# Patient Record
Sex: FEMALE | Race: WHITE
[De-identification: names, ages, dates, MRNs, and addresses within clinical notes are randomized per-mention and may not be internally consistent; named-entity substitution may affect disease eponyms.]

---

## 2018-02-06 ENCOUNTER — HOSPITAL ENCOUNTER (EMERGENCY)
Dept: HOSPITAL 45 - C.EDA | Age: 58
Discharge: HOME | End: 2018-02-06
Payer: COMMERCIAL

## 2018-02-06 VITALS — TEMPERATURE: 98.6 F

## 2018-02-06 VITALS
HEIGHT: 59.02 IN | HEIGHT: 59.02 IN | BODY MASS INDEX: 58.22 KG/M2 | BODY MASS INDEX: 58.22 KG/M2 | WEIGHT: 288.81 LBS | WEIGHT: 288.81 LBS

## 2018-02-06 VITALS — DIASTOLIC BLOOD PRESSURE: 78 MMHG | HEART RATE: 92 BPM | SYSTOLIC BLOOD PRESSURE: 124 MMHG | OXYGEN SATURATION: 98 %

## 2018-02-06 DIAGNOSIS — Z79.82: ICD-10-CM

## 2018-02-06 DIAGNOSIS — R10.11: Primary | ICD-10-CM

## 2018-02-06 DIAGNOSIS — R05: ICD-10-CM

## 2018-02-06 DIAGNOSIS — K90.0: ICD-10-CM

## 2018-02-06 DIAGNOSIS — R73.03: ICD-10-CM

## 2018-02-06 LAB
ALBUMIN SERPL-MCNC: 3.4 GM/DL (ref 3.4–5)
ALP SERPL-CCNC: 115 U/L (ref 45–117)
ALT SERPL-CCNC: 17 U/L (ref 12–78)
AST SERPL-CCNC: 11 U/L (ref 15–37)
BASOPHILS # BLD: 0.02 K/UL (ref 0–0.2)
BASOPHILS NFR BLD: 0.2 %
BUN SERPL-MCNC: 16 MG/DL (ref 7–18)
CALCIUM SERPL-MCNC: 9.1 MG/DL (ref 8.5–10.1)
CO2 SERPL-SCNC: 30 MMOL/L (ref 21–32)
CREAT SERPL-MCNC: 0.74 MG/DL (ref 0.6–1.2)
EOS ABS #: 0.23 K/UL (ref 0–0.5)
EOSINOPHIL NFR BLD AUTO: 326 K/UL (ref 130–400)
GLUCOSE SERPL-MCNC: 144 MG/DL (ref 70–99)
HCT VFR BLD CALC: 42.7 % (ref 37–47)
HGB BLD-MCNC: 14.1 G/DL (ref 12–16)
IG#: 0.01 K/UL (ref 0–0.02)
IMM GRANULOCYTES NFR BLD AUTO: 33.1 %
LIPASE: 100 U/L (ref 73–393)
LYMPHOCYTES # BLD: 3.23 K/UL (ref 1.2–3.4)
MCH RBC QN AUTO: 29.1 PG (ref 25–34)
MCHC RBC AUTO-ENTMCNC: 33 G/DL (ref 32–36)
MCV RBC AUTO: 88.2 FL (ref 80–100)
MONO ABS #: 0.47 K/UL (ref 0.11–0.59)
MONOCYTES NFR BLD: 4.8 %
NEUT ABS #: 5.81 K/UL (ref 1.4–6.5)
NEUTROPHILS # BLD AUTO: 2.4 %
NEUTROPHILS NFR BLD AUTO: 59.4 %
PMV BLD AUTO: 9.2 FL (ref 7.4–10.4)
POTASSIUM SERPL-SCNC: 3.8 MMOL/L (ref 3.5–5.1)
PROT SERPL-MCNC: 7.2 GM/DL (ref 6.4–8.2)
RED CELL DISTRIBUTION WIDTH CV: 13.7 % (ref 11.5–14.5)
RED CELL DISTRIBUTION WIDTH SD: 44.3 FL (ref 36.4–46.3)
SODIUM SERPL-SCNC: 141 MMOL/L (ref 136–145)
WBC # BLD AUTO: 9.77 K/UL (ref 4.8–10.8)

## 2018-02-06 NOTE — DIAGNOSTIC IMAGING REPORT
GALLBLADDER-ABD LIMITED



CLINICAL HISTORY: 57 years-old Female presenting with RUQ pain, nausea, pain

with coughing. 



TECHNIQUE: Real-time grayscale and limited color Doppler ultrasound imaging of

the abdomen limited to the right upper quadrant was performed. 



COMPARISON: CT performed earlier the same day.



FINDINGS:



Pancreas: Visualized portions of the pancreatic head and body normal.



Liver: Moderately hyperechogenic parenchyma with partial obscuration of the

right hemidiaphragm, likely indicating moderate steatosis. The liver measures

20.9 cm in maximal sagittal dimension. Focal echogenicity in the liver hilum

within the liver parenchyma, which likely correlates with the 1.5 cm hypodensity

on CT. This is indeterminate but most likely represents a hemangioma or more

focal pronounced fatty deposition. Main portal vein patent with normal

directional flow.



Biliary: No intrahepatic biliary ductal dilatation. Common bile duct measures up

to 4 mm in diameter.



Gallbladder: No evidence of gallstones, gallbladder wall thickening, gallbladder

distention, or pericholecystic fluid or inflammatory change. Unable to assess

sonographic Benitez's sign.



Right kidney: Normal in appearance. No hydronephrosis.



Ascites: None.



IMPRESSION: 

1.  Hepatic steatosis. Correlate with liver function tests to exclude

steatohepatitis as a cause for abdominal pain.



2.  Suspected small hemangioma versus more focal fatty deposition centrally in

the liver.



3.  No cholelithiasis or biliary ductal dilatation.







Electronically signed by:  Anton Virgen M.D.

2/6/2018 7:11 AM



Dictated Date/Time:  2/6/2018 7:08 AM

## 2018-02-06 NOTE — EMERGENCY ROOM VISIT NOTE
History


First contact with patient:  01:20


Chief Complaint:  ABDOMINAL PAIN


Stated Complaint:  ABD PAIN


Nursing Triage Summary:  


patient c/o sudden onset RLQ pain tonight after coughing while out to eat at 


dinner. patient states pain worsens with movement.  patient states she feels 


bloated . hx of celiac disease. given 4mg zofran and 5mg morphine IM by EMS 


prior to arrival.





History of Present Illness


The patient is a 57 year old female who presents to the Emergency Room with 

complaints of right-sided abdominal pain.  The patient reports that she ate 

gluten-free pizza for dinner, and while she was at the restaurant she coughed 

and developed sudden onset of right-sided abdominal pain.  She reports that she 

is more bloated than usual.  She has had severe pain in the right upper abdomen 

with radiation into the back.  The pain is worse with coughing.  She has 

difficulty walking and standing due to the pain.  She was able to fall asleep 

for a few hours, but states that she woke up with more severe pain.  She 

reports there was associated nausea and presyncopal symptoms.  She states her 

pain was initially 10/10, but it has now improved to an 8-9/10 after receiving 

morphine en route.  She reports history of hysterectomy and breast reduction 

surgery.  She has had no associated urinary symptoms, changes in bowel 

movements or vomiting.  She denies chest pain or shortness of breath.





Review of Systems


A complete 10 point review of systems was reviewed with the patient with 

pertinent positives and negatives as per history of present illness. All else 

were negative.





Past Medical/Surgical History


Medical Problems:


(1) Celiac disease


(2) Pre-diabetes


Surgical Problems:


(1) H/O bilateral breast reduction surgery


(2) History of hysterectomy








Social History


Smoking Status:  Never Smoker


Occupation Status:  employed





Current/Historical Medications


Scheduled


Aspirin (Aspirin Ec), 81 MG PO DAILY


Cetirizine (Zyrtec), 10 MG PO DAILY


Cholecalciferol (Vitamin D3), Unknown Dose PO DAILY


Omeprazole (Prilosec), 20 MG PO DAILY


Oxymetazoline Hcl (Nasal Spray), 1 SPRAY NA DAILY


Sertraline (Zoloft), 100 MG PO DAILY





Scheduled PRN


Hydrocodone W/ Homatropine (Hycodan 5/1.5MG 5 Ml), 5-10 ML PO Q4H PRN for Cough





Physical Exam


Vital Signs











  Date Time  Temp Pulse Resp B/P (MAP) Pulse Ox O2 Delivery O2 Flow Rate FiO2


 


2/6/18 06:46  92 20 124/78 98   


 


2/6/18 04:53  66 18 126/72 96 Room Air  


 


2/6/18 02:59  70 20 119/85 91 Room Air  


 


2/6/18 01:18 37.0 71 18 150/91 97 Room Air  











Physical Exam


VITALS: Vitals are noted on the nurse's note and reviewed by myself.  Vital 

signs stable.


GENERAL: This is a 57-year-old female, in no acute distress, nondiaphoretic, 

well-developed well-nourished.


SKIN: The skin was without rashes.


HEAD: Normocephalic atraumatic.  


EARS: External auditory canals clear, tympanic membranes pearly gray without 

erythema or effusion bilaterally.


EYES: Pupils equal round and reactive to light and accommodation.  


MOUTH: Mucous membranes moist.  Tonsils are not enlarged.  Pharynx without 

erythema or exudate. 


NECK: Supple without nuchal rigidity.  No lymphadenopathy.  


HEART: Regular rate and rhythm without murmurs gallops or rubs.


LUNGS: Clear to auscultation bilaterally without wheezes, rales or rhonchi.  No 

retractions or accessory muscle use.


ABDOMEN: Positive bowel sounds x 4.  Soft, obese abdomen with tenderness in the 

right flank/right upper quadrant.  No guarding or rebound tenderness.


NEURO: Patient was alert and oriented to person place and time.





Medical Decision & Procedures


ER Provider


Diagnostic Interpretation:


CT ABDOMEN & PELVIS WITHOUT CONTRAST:





Noncontrast study.


No renal calculi.  No hydronephrosis.


No free fluid.  Bowel is normal in caliber.  The appendix is normal in 

appearance.  Diverticulosis without evidence of diverticulitis.


Solid organs are otherwise unremarkable on a non-infused exam.


Small umbilical hernia containing fat.


Absent uterus.








US RUQ:





Correlated with the earlier CT exam.


Technically limited study due to body habitus.


No gallstones.  No wall thickening or pericholecystic fluid.  Normal caliber 

common duct.


Hepatomegaly with fatty infiltration.  2.21.21.8 cm focal echogenicity in the 

reji hepatis may represent state of increased fat deposition.  Differential 

consideration is small hemangioma.


Remainder unremarkable.





Radiologist:  Dacia Clark MD








CHEST W/ RIGHT RIB DETAIL:





No rib fractures.  No acute cardiopulmonary disease.





Laboratory Results


2/6/18 01:20








Red Blood Count 4.84, Mean Corpuscular Volume 88.2, Mean Corpuscular Hemoglobin 

29.1, Mean Corpuscular Hemoglobin Concent 33.0, Mean Platelet Volume 9.2, 

Neutrophils (%) (Auto) 59.4, Lymphocytes (%) (Auto) 33.1, Monocytes (%) (Auto) 

4.8, Eosinophils (%) (Auto) 2.4, Basophils (%) (Auto) 0.2, Neutrophils # (Auto) 

5.81, Lymphocytes # (Auto) 3.23, Monocytes # (Auto) 0.47, Eosinophils # (Auto) 

0.23, Basophils # (Auto) 0.02





2/6/18 01:20

















Test


  2/6/18


01:20 2/6/18


02:40 2/6/18


05:53


 


White Blood Count


  9.77 K/uL


(4.8-10.8) 


  


 


 


Red Blood Count


  4.84 M/uL


(4.2-5.4) 


  


 


 


Hemoglobin


  14.1 g/dL


(12.0-16.0) 


  


 


 


Hematocrit 42.7 % (37-47)   


 


Mean Corpuscular Volume


  88.2 fL


() 


  


 


 


Mean Corpuscular Hemoglobin


  29.1 pg


(25-34) 


  


 


 


Mean Corpuscular Hemoglobin


Concent 33.0 g/dl


(32-36) 


  


 


 


Platelet Count


  326 K/uL


(130-400) 


  


 


 


Mean Platelet Volume


  9.2 fL


(7.4-10.4) 


  


 


 


Neutrophils (%) (Auto) 59.4 %   


 


Lymphocytes (%) (Auto) 33.1 %   


 


Monocytes (%) (Auto) 4.8 %   


 


Eosinophils (%) (Auto) 2.4 %   


 


Basophils (%) (Auto) 0.2 %   


 


Neutrophils # (Auto)


  5.81 K/uL


(1.4-6.5) 


  


 


 


Lymphocytes # (Auto)


  3.23 K/uL


(1.2-3.4) 


  


 


 


Monocytes # (Auto)


  0.47 K/uL


(0.11-0.59) 


  


 


 


Eosinophils # (Auto)


  0.23 K/uL


(0-0.5) 


  


 


 


Basophils # (Auto)


  0.02 K/uL


(0-0.2) 


  


 


 


RDW Standard Deviation


  44.3 fL


(36.4-46.3) 


  


 


 


RDW Coefficient of Variation


  13.7 %


(11.5-14.5) 


  


 


 


Immature Granulocyte % (Auto) 0.1 %   


 


Immature Granulocyte # (Auto)


  0.01 K/uL


(0.00-0.02) 


  


 


 


Anion Gap


  7.0 mmol/L


(3-11) 


  


 


 


Est Creatinine Clear Calc


Drug Dose 103.7 ml/min 


  


  


 


 


Estimated GFR (


American) 104.2 


  


  


 


 


Estimated GFR (Non-


American 89.9 


  


  


 


 


BUN/Creatinine Ratio 22.0 (10-20)   


 


Calcium Level


  9.1 mg/dl


(8.5-10.1) 


  


 


 


Total Bilirubin


  0.2 mg/dl


(0.2-1) 


  


 


 


Aspartate Amino Transf


(AST/SGOT) 11 U/L (15-37) 


  


  


 


 


Alanine Aminotransferase


(ALT/SGPT) 17 U/L (12-78) 


  


  


 


 


Alkaline Phosphatase


  115 U/L


() 


  


 


 


Troponin I


  < 0.015 ng/ml


(0-0.045) 


  


 


 


Total Protein


  7.2 gm/dl


(6.4-8.2) 


  


 


 


Albumin


  3.4 gm/dl


(3.4-5.0) 


  


 


 


Globulin


  3.8 gm/dl


(2.5-4.0) 


  


 


 


Albumin/Globulin Ratio 0.9 (0.9-2)   


 


Lipase


  100 U/L


() 


  


 


 


Urine Color  YELLOW  


 


Urine Appearance  CLEAR (CLEAR)  


 


Urine pH  5.0 (4.5-7.5)  


 


Urine Specific Gravity


  


  1.022


(1.000-1.030) 


 


 


Urine Protein  NEG (NEG)  


 


Urine Glucose (UA)  NEG (NEG)  


 


Urine Ketones  NEG (NEG)  


 


Urine Occult Blood  NEG (NEG)  


 


Urine Nitrite  NEG (NEG)  


 


Urine Bilirubin  NEG (NEG)  


 


Urine Urobilinogen  NEG (NEG)  


 


Urine Leukocyte Esterase  NEG (NEG)  


 


D-Dimer


  


  


  < 190 ug/L FEU


(0-500)











Medications Administered











 Medications


  (Trade)  Dose


 Ordered  Sig/Roge


 Route  Start Time


 Stop Time Status Last Admin


Dose Admin


 


 Morphine Sulfate


  (MoRPHine


 SULFATE INJ)  8 mg  NOW  STAT


 IV  2/6/18 01:34


 2/6/18 01:36 DC 2/6/18 01:46


8 MG


 


 Lidocaine HCl


  (Viscous


 Lidocaine 2% Soln)  20 ml  STK-MED ONCE


 .ROUTE  2/6/18 05:30


 2/6/18 05:31 DC 2/6/18 05:34


20 ML


 


 Al Hydroxide/Mg


 Hydroxide


  (Maalox Susp)  30 ml  STK-MED ONCE


 .ROUTE  2/6/18 05:31


 2/6/18 05:32 DC 2/6/18 05:34


30 ML











ECG


Rate (beats per minute):  69


Rhythm:  normal sinus


Findings:  no acute ischemic change, no ectopy, other (incomplete RBBB)


Comparison ECG Date:  no prior available





ED Course


The patient was evaluated as above.  Labs were drawn and IV access was 

obtained.  





Patient was medicated with 8 mg morphine IV.





Patient was reevaluated and was complaining of pain.  A GI cocktail was ordered.





Discharge instructions were reviewed with the patient.  The patient verbalized 

understanding of my assessment and treatment plan and was discharged home in 

good condition.





Medical Decision


Differential diagnosis includes cholecystitis, pancreatitis, pulmonary embolism

, pneumonia, rib fracture, rib contusion, bowel obstruction, pyelonephritis, 

among others.





The patient is a 57-year-old female who presents today complaining of right 

upper quadrant pain.  Labs revealed no leukocytosis, anemia or electrolyte 

abnormalities.  Glucose slightly elevated at 144.  Urinalysis was not 

suggestive of infection.  Lipase was not elevated.  LFTs within normal limits.  

EKG was interpreted by myself and shows a normal sinus rhythm without ischemic 

changes.  Troponin and d-dimer within normal limits.  CT of the abdomen and 

pelvis without contrast was performed and showed no evidence of kidney stone or 

other acute pathology.  Ultrasound of the gallbladder was obtained and shows no 

acute findings.  On reevaluation, the patient was questioning whether she may 

have a rib fracture.  There has been no evidence of trauma in this seems very 

unlikely.  I did order a chest x-ray with right rib detail which is without 

fractures.  Patient may have an intercostal sprain.  Workup today has been 

completely negative.  She was advised that she will need follow-up with her 

primary care provider for further evaluation.  She was given a home pack of 

Hycodan cough syrup, as her symptoms seem to worsen when she is coughing.





The patient's case was reviewed with Dr. Martinez, ED attending physician, who 

agreed with my assessment and treatment plan.





Based on the patient's presentation and work up, I feel the patient is stable 

for outpatient treatment.  The patient was educated to return to the emergency 

department for any worsening of their current condition or new/concerning 

symptoms.  She will follow up with her PCP.





Medication Reconcilliation


Current Medication List:  was personally reviewed by me





Blood Pressure Screening


Patient's blood pressure:  Normal blood pressure





Impression





 Primary Impression:  


 Right upper quadrant abdominal pain





Departure Information


Dispostion


Home / Self-Care





Condition


GOOD





Prescriptions





Hydrocodone W/ Homatropine (HYCODAN 5/1.5MG 5 ML) 1 Syp Syp


5-10 ML PO Q4H Y for Cough, #60 ML


   Prov: Sophie Rose ., LASHAWN         2/6/18





Referrals


No Doctor, Assigned (PCP)





Patient Instructions


[facility]





Additional Instructions





You have been prescribed Hycodan cough syrup to be used for pain control.   

This is a narcotic medication. You cannot drive or consume alcohol while on 

this medicine. This medicine should only be used for pain that cannot be 

controlled with over-the-counter pain medicines.





For pain control, you can use the following over-the-counter medicines (if >11 yo):





- Regular strength (325mg/tab) Tylenol (acetaminophen) 2 tabs every 4-6 hours 

as needed. Do not exceed 12 tablets in a 24 hour period. Avoid taking more than 

4 grams (4000 mg) of Tylenol per day. This includes any other sources of 

acetaminophen you may take on a regular basis.





- Regular strength (200 mg/tab) Advil (ibuprofen) 1-2 tabs every 4-6 hours as 

needed. Do not exceed a dose of 3200 mg per day.





Follow-up with your primary care provider this week.





Return to the emergency department with worsening abdominal pain, vomiting, 

fevers, or any other new/concerning symptoms.

## 2018-02-06 NOTE — DIAGNOSTIC IMAGING REPORT
ABD/PELVIS NO IV OR ORAL CONT



CT DOSE: 1169.23 mGycm



HISTORY: Flank pain  right flank/RUQ pain



TECHNIQUE: Multiaxial CT images of the abdomen and pelvis were performed without

contrast.  A dose lowering technique was utilized adhering to the principles of

ALARA.





COMPARISON STUDY: None.



FINDINGS: The lung bases are clear. The unenhanced liver, spleen, gallbladder,

pancreas, kidneys, and adrenal glands are within normal limits. No bowel wall

thickening or obstruction. The pelvic organs are unremarkable. No suspicious

lytic or blastic osseous lesions. Small fat-containing periumbilical hernia.

Normal appendix. Nonobstructive bowel pattern.



IMPRESSION: 

No significant abnormality identified within the abdomen or pelvis. 









The above report was generated using voice recognition software.  It may contain

grammatical, syntax or spelling errors.







Electronically signed by:  Srinivasa Nam M.D.

2/6/2018 7:01 AM



Dictated Date/Time:  2/6/2018 6:59 AM

## 2018-02-06 NOTE — DIAGNOSTIC IMAGING REPORT
RIBS UNILATERAL WITH PA CHEST



CLINICAL HISTORY: right chest pain pain



COMPARISON STUDY:  None



FINDINGS: Negative right ribs. Cortical margins are intact. Mild cardiomegaly.

No evidence of pneumothorax. Platelike atelectasis both lung bases.



IMPRESSION:  Negative right ribs. Platelike atelectasis both lung bases. 













The above report was generated using voice recognition software.  It may contain

grammatical, syntax or spelling errors.







Electronically signed by:  Srinivasa Nam M.D.

2/6/2018 6:28 AM



Dictated Date/Time:  2/6/2018 6:27 AM